# Patient Record
Sex: FEMALE | Race: BLACK OR AFRICAN AMERICAN | NOT HISPANIC OR LATINO | Employment: OTHER | URBAN - METROPOLITAN AREA
[De-identification: names, ages, dates, MRNs, and addresses within clinical notes are randomized per-mention and may not be internally consistent; named-entity substitution may affect disease eponyms.]

---

## 2017-06-29 ENCOUNTER — TRANSCRIBE ORDERS (OUTPATIENT)
Dept: PHYSICAL THERAPY | Facility: HOSPITAL | Age: 47
End: 2017-06-29

## 2017-06-29 DIAGNOSIS — M79.7 FIBROMYALGIA: Primary | ICD-10-CM

## 2017-06-29 DIAGNOSIS — R53.82 CHRONIC FATIGUE: ICD-10-CM

## 2017-07-27 ENCOUNTER — HOSPITAL ENCOUNTER (OUTPATIENT)
Dept: PHYSICAL THERAPY | Facility: HOSPITAL | Age: 47
Setting detail: THERAPIES SERIES
Discharge: HOME | End: 2017-07-27

## 2017-07-27 DIAGNOSIS — M79.601 PAIN IN BOTH UPPER EXTREMITIES: ICD-10-CM

## 2017-07-27 DIAGNOSIS — G93.32 CHRONIC FATIGUE SYNDROME WITH FIBROMYALGIA: Primary | ICD-10-CM

## 2017-07-27 DIAGNOSIS — M79.602 PAIN IN BOTH UPPER EXTREMITIES: ICD-10-CM

## 2017-07-27 DIAGNOSIS — M79.7 CHRONIC FATIGUE SYNDROME WITH FIBROMYALGIA: Primary | ICD-10-CM

## 2017-07-27 PROCEDURE — 97162 PT EVAL MOD COMPLEX 30 MIN: CPT | Performed by: PHYSICAL THERAPIST

## 2017-07-27 NOTE — THERAPY EVALUATION
Outpatient Physical Therapy Ortho Initial Evaluation  [facility]     Patient Name: Sherlyn Russell  : 1970  MRN: [id number]  Today's Date: 2017      Visit Date: 2017    There is no problem list on file for this patient.       No past medical history on file.     No past surgical history on file.    Visit Dx:     ICD-10-CM ICD-9-CM   1. Chronic fatigue syndrome with fibromyalgia R53.82 780.71    M79.7 729.1   2. Pain in both upper extremities M79.601 729.5    M79.602              Patient History       17 1400          History    Brief Description of Current Complaint Tried land PT previously however did not tolerate due to her pain and tenderness.  Recently diagnosed with fibromyalgia, has had symptoms for about 2 years. 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  Pain started in L foot and worked its way up the leg to the knees.  The fingers started to lock up and eventually pain moved to her neck and back withy difficulty moving her head.  Pain then travelled to each arm. Received a cortisone injection to each elbow which gave 24 hours of relief and then pain returned.  Sees Pain management. Had injections in posterior shoulder area February and March which were not helpful.  Is scheduled for another treatment tomorrow which may be for injection to her neck she thinks.  No falls in the past year.  Has difficulty raising arms, (R hand dominant).  Able to walk for 20-30 minutes.  Intermittent numbenss in hands and fingers.  No skin problems.  Using a prescription topical cream - which she feels doesn't helps much.  Does see a counselor every week. Xrays of cervical spine and arm were negative per pt.  MRI scan of neck- showed arthritis and L shoulder done Monday- results pending. Goals: decrease pain,  be able to play with 10 year old granddaughter, be able to raise arms overhead to fix hair and wash back.   Medical history  includes diabetes, acid reflux, depression and anxiety.  -WILLIAM      Onset Date- PT 7/27/2017  -WILLIAM      Occupation/sports/leisure activities Cooking, cleaning, helping people, taking trips  -WILLIAM      Pain     Pain Location --   See body chart  -WILLIAM      Pain at Present 6  -WILLIAM      Pain at Best 6  -WILLIAM      Pain at Worst 8  -WILLIAM      Daily Activities    Pt Participated in POC and Goals Yes  -WILLIAM      Safety    Are you being hurt, hit, or frightened by anyone at home or in your life? No  -WILLIAM      Are you being neglected by a caregiver No  -WILLIAM        User Key  (r) = Recorded By, (t) = Taken By, (c) = Cosigned By    Initials Name Provider Type    WILLIAM Bony Echevarria, PT Physical Therapist                PT Ortho       07/27/17 1300    Posture/Observations    Posture/Observations Comments Forward head and shoulders, elbows flexed (~ 30 degrees).  -WILLIAM    Myotomal Screen- Upper Quarter Clearing     --   Dynamometer R 10#, 3#, 1#,  L 2#, 1#, 1#  -WILLIAM    Cervical/Shoulder ROM Screen    Cervical flexion Impaired   Minimal loss of motion.  -WILLIAM    Cervical extension Impaired   Has 25% of motion.  -WILLIAM    Cervical lateral flexion Impaired   R 10 degrees, L 15 degrees  -WILLIAM    Cervical rotation Impaired   R 10 degrees, L 15 degrees  -WILLIAM    Left Shoulder    Flexion AROM Deficit 60  -WILLIAM    Flexion PROM --   75  -WILLIAM    ABduction AROM Deficit 60  -WILLIAM    ABduction PROM --   60  -WILLIAM    External Rotation AROM Deficit 30  -WILLIAM    Internal Rotation AROM Deficit 30  -WILLIAM    Right Shoulder    Flexion AROM Deficit 75  -WILLIAM    ABduction AROM Deficit 65  -WILLIAM    External Rotation AROM Deficit 35  -WILLIAM    Internal Rotation AROM Deficit 40  -WILLIAM    Left Elbow/Forearm    Extension/Flexion AROM Deficit Lacks 30 degrees from full extension, Flexion is WNL.  -WILLIAM    Right Elbow/Forearm    Extension/Flexion AROM Deficit Lacks 30 degrees from full extension,  Flexion is WNL  -WILLIAM    Gait Assessment/Treatment    Gait, Greenlee Level independent  -WILLIAM    Gait, Comment  Lateral trunk sway during L stance phase, lack of B arm swing, elbows remain flexed, guarded posture.  -WILLIAM      User Key  (r) = Recorded By, (t) = Taken By, (c) = Cosigned By    Initials Name Provider Type    WILLIAM Echevarria, PT Physical Therapist                                PT OP Goals       07/27/17 1400       PT Short Term Goals    STG 1 Sherlyn will tolerate 30-40 minutes aquatic PT in warm pool.  -WILLIAM     STG 1 Progress Met  -WILLIAM     STG 2 Sherlyn will elevate arms to 90 degrees in pool with water at shoulder level.   -WILLIAM     STG 2 Progress New  -WILLIAM     STG 3 Sherlyn will report a decrease in her symptoms during and after aquatic PT.  -WILLIAM     STG 3 Progress New  -WILLIAM     STG 4 Sherlyn will be able to spend time/play with grand daughter.  -WILLIAM     STG 4 Progress New  -WILLIAM     Long Term Goals    LTG 1 Sherlyn will report a decreas in her peak pain from 8/10 to 5/10 or less.  -WILLIAM     LTG 1 Progress New  -WILLIAM     LTG 2 Sherlyn will improve B shoulder AROM to reach overhead to wash and fix hair.  -WILLIAM     LTG 2 Progress New  -WILLIAM     LTG 3 Sherlyn will increase B elbow extension ROM to 10 degrees from neutral.  -WILLIAM     LTG 3 Progress New  -WILLIAM     LTG 4 Sherlyn will improve B  strength to 10 pounds on L and 15 pounds on R to be able to open jars and carry itens.  -WILLIAM     LTG 4 Progress New  -WILLIAM     LTG 5 Improve FIQR by 10%  -WILLIAM     LTG 5 Progress New  -WILLIAM       User Key  (r) = Recorded By, (t) = Taken By, (c) = Cosigned By    Initials Name Provider Type    WILLIAM Echevarria, PT Physical Therapist                PT Assessment/Plan       07/27/17 1404       PT Assessment    Functional Limitations Limitation in home management;Limitations in functional capacity and performance;Performance in leisure activities;Performance in self-care ADL  -WILLIAM     Impairments Pain;Muscle strength;Joint mobility;Range of motion  -WILLIAM     Assessment Comments Sherlyn is a 47 year old female referred to aquatic PT with diagnosis of  fibromylagia and chronic fatigue.  She presents with diffuse pain and tenderness throughout, more significant in B UEs and neck.  She has limited UE AROM and poor  strength.  Formal MMT was not tolerated.  Sherlyn reports she is unable to carry her purse, cook, open jars, lift an half gallon of milk or lift items out of the oven.  She was unable to tolerate land based PT.   -WILLIAM     Please refer to paper survey for additional self-reported information Yes  -WILLIAM     Rehab Potential Other (comment)   Fair to Good  -WILLIAM     Patient/caregiver participated in establishment of treatment plan and goals Yes  -WILLIAM     Patient would benefit from skilled therapy intervention Yes  -WILLIAM     PT Plan    PT Frequency 2x/week  -WILLIAM     Predicted Duration of Therapy Intervention (days/wks) 4 weeks  -WILLIAM     Planned CPT's? PT AQUATIC THERAPY EA 15 MIN: 83551  -WILLIAM     PT Plan Comments Aquatic therapy to improve shoulder and elbow ROM and strength, decrease pain, improve function.  -WILLIAM       User Key  (r) = Recorded By, (t) = Taken By, (c) = Cosigned By    Initials Name Provider Type    WILLIAM Bony Echevarria, PT Physical Therapist                                    Time Calculation:   Start Time: 1303  Stop Time: 1345  Time Calculation (min): 42 min     Therapy Charges for Today     Code Description Service Date Service Provider Modifiers Qty    85428111515 HC PT POLO SARMIENTOAL MOD COMPLEXITY 3 7/27/2017 Bony Echevarria, PT GP 1                    Bony Echevarria, PT  7/27/2017

## 2017-08-08 ENCOUNTER — HOSPITAL ENCOUNTER (OUTPATIENT)
Dept: PHYSICAL THERAPY | Facility: HOSPITAL | Age: 47
Setting detail: THERAPIES SERIES
Discharge: HOME | End: 2017-08-08

## 2017-08-08 DIAGNOSIS — M79.602 PAIN IN BOTH UPPER EXTREMITIES: ICD-10-CM

## 2017-08-08 DIAGNOSIS — M79.7 CHRONIC FATIGUE SYNDROME WITH FIBROMYALGIA: Primary | ICD-10-CM

## 2017-08-08 DIAGNOSIS — G93.32 CHRONIC FATIGUE SYNDROME WITH FIBROMYALGIA: Primary | ICD-10-CM

## 2017-08-08 DIAGNOSIS — M79.601 PAIN IN BOTH UPPER EXTREMITIES: ICD-10-CM

## 2017-08-08 PROCEDURE — 97113 AQUATIC THERAPY/EXERCISES: CPT | Performed by: PHYSICAL THERAPIST

## 2017-08-08 NOTE — THERAPY TREATMENT NOTE
Outpatient Physical Therapy Ortho Treatment Note  [facility]     Patient Name: Sherlyn Russell  : 1970  MRN: [id number]  Today's Date: 2017      Visit Date: 2017    Visit Dx:    ICD-10-CM ICD-9-CM   1. Chronic fatigue syndrome with fibromyalgia R53.82 780.71    M79.7 729.1   2. Pain in both upper extremities M79.601 729.5    M79.602        There is no problem list on file for this patient.       No past medical history on file.     No past surgical history on file.                          PT Assessment/Plan       17 1609       PT Assessment    Assessment Comments pt tolerated ROM left shoulder in plane below and in front of her. weakness with exerting pressure with LEs but this should get better as she gets used to pool and how she feels post ex.   -SP       User Key  (r) = Recorded By, (t) = Taken By, (c) = Cosigned By    Initials Name Provider Type    ANGELA Ramsey PT Physical Therapist                    Exercises       17 1600          Subjective Comments    Subjective Comments states MRI found a tear in her left rotator cuff now she has to see ortho. tired from taking care of her son's 3 puppies lately. hopes water can make her feel better. no prior water ex and does not swim but did not seem fearful of water today  -SP      Subjective Pain    Able to rate subjective pain? yes  -SP      Pre-Treatment Pain Level 6  -SP      Post-Treatment Pain Level 5  -SP      Subjective Pain Comment FLACC  -SP      Aquatics    Aquatics performed? Yes  -SP      Exercise 1    Exercise Name 1 intro to pool via warm water ex on bench. small vs. large ROM today. no resistance just AROM. decompression and biking with LN. hip stretching did okay with LN. pt shaky by end of rx stating she hasn't eaten all day and is borderline DM  -SP        User Key  (r) = Recorded By, (t) = Taken By, (c) = Cosigned By    Initials Name Provider Type    ANGELA Ramsey PT Physical Therapist                                        Time Calculation:   Start Time: 1515  Stop Time: 1600  Time Calculation (min): 45 min    Therapy Charges for Today     Code Description Service Date Service Provider Modifiers Qty    59506198198 HC PT AQUATIC THERAPY EA 15 MIN 8/8/2017 Zorre Zeno Kimura, PT GP 3                    Zorre Zeno Kimura, PT  8/8/2017

## 2017-08-15 ENCOUNTER — HOSPITAL ENCOUNTER (OUTPATIENT)
Dept: PHYSICAL THERAPY | Facility: HOSPITAL | Age: 47
Setting detail: THERAPIES SERIES
Discharge: HOME | End: 2017-08-15

## 2017-08-15 DIAGNOSIS — M79.602 PAIN IN BOTH UPPER EXTREMITIES: ICD-10-CM

## 2017-08-15 DIAGNOSIS — M79.7 CHRONIC FATIGUE SYNDROME WITH FIBROMYALGIA: Primary | ICD-10-CM

## 2017-08-15 DIAGNOSIS — G93.32 CHRONIC FATIGUE SYNDROME WITH FIBROMYALGIA: Primary | ICD-10-CM

## 2017-08-15 DIAGNOSIS — M79.601 PAIN IN BOTH UPPER EXTREMITIES: ICD-10-CM

## 2017-08-15 PROCEDURE — 97113 AQUATIC THERAPY/EXERCISES: CPT | Performed by: PHYSICAL THERAPIST

## 2017-08-15 NOTE — THERAPY TREATMENT NOTE
"    Outpatient Physical Therapy Ortho Treatment Note  [facility]     Patient Name: Sherlyn Russell  : 1970  MRN: [id number]  Today's Date: 8/15/2017      Visit Date: 08/15/2017    Visit Dx:    ICD-10-CM ICD-9-CM   1. Chronic fatigue syndrome with fibromyalgia R53.82 780.71    M79.7 729.1   2. Pain in both upper extremities M79.601 729.5    M79.602        There is no problem list on file for this patient.       No past medical history on file.     No past surgical history on file.                          PT Assessment/Plan       08/15/17 1715       PT Assessment    Assessment Comments Patient guarded with L shoulder demonstrating very little AROM away from body and also painful to most other exercise attempts for cervical and lower body.  Today patient seems to have too high pain level limiting to aquatic exercises.   -PB       User Key  (r) = Recorded By, (t) = Taken By, (c) = Cosigned By    Initials Name Provider Type    SUKHI Erazo, PT Physical Therapist                    Exercises       08/15/17 1700          Subjective Comments    Subjective Comments Pain the worst in L shoulder and waiting on schedule for ortho appt   -PB      Subjective Pain    Able to rate subjective pain? yes  -PB      Pre-Treatment Pain Level 7  -PB      Post-Treatment Pain Level 7  -PB      Aquatics    Aquatics performed? Yes  -PB      Aquatics LE    Water Walk forward;side   x 2 laps  -PB      Stretch 1 HS / hip rotation with SN x 30: B  -PB      Stretch 2 Seated calf stretch x 30\" B   -PB      Stretch 3 Attempted gentle cervical stretching with pain   -PB      March in Place Seated x 10  -PB      Bicycle Seated x 2 min   -PB      Flutter/Scissor Seated x 15 each   -PB      Aquatics UE    Stretch 1 AAROM with SN press forwards x 10   -PB      Stretch 2 AAROM with SN side to side x 10   -PB      Scap Retraction/Row Shoulder rolls x 10   -PB      Scaption AAROM shoulder flexion x 3   -PB        User Key  (r) = Recorded By, " (t) = Taken By, (c) = Cosigned By    Initials Name Provider Type    PB Nancy Erazo, PT Physical Therapist                                       Time Calculation:   Start Time: 1350  Stop Time: 1429  Time Calculation (min): 39 min    Therapy Charges for Today     Code Description Service Date Service Provider Modifiers Qty    48212610264 HC PT AQUATIC THERAPY EA 15 MIN 8/15/2017 Nancy Erazo, PT GP 3                    Nancy Erazo PT  8/15/2017

## 2017-08-18 ENCOUNTER — HOSPITAL ENCOUNTER (OUTPATIENT)
Dept: PHYSICAL THERAPY | Facility: HOSPITAL | Age: 47
Setting detail: THERAPIES SERIES
End: 2017-08-18

## 2017-08-22 ENCOUNTER — HOSPITAL ENCOUNTER (OUTPATIENT)
Dept: PHYSICAL THERAPY | Facility: HOSPITAL | Age: 47
Setting detail: THERAPIES SERIES
Discharge: HOME | End: 2017-08-22

## 2017-08-22 DIAGNOSIS — G93.32 CHRONIC FATIGUE SYNDROME WITH FIBROMYALGIA: Primary | ICD-10-CM

## 2017-08-22 DIAGNOSIS — M79.602 PAIN IN BOTH UPPER EXTREMITIES: ICD-10-CM

## 2017-08-22 DIAGNOSIS — M79.7 CHRONIC FATIGUE SYNDROME WITH FIBROMYALGIA: Primary | ICD-10-CM

## 2017-08-22 DIAGNOSIS — M79.601 PAIN IN BOTH UPPER EXTREMITIES: ICD-10-CM

## 2017-08-22 PROCEDURE — 97113 AQUATIC THERAPY/EXERCISES: CPT | Performed by: PHYSICAL THERAPIST

## 2017-08-22 NOTE — THERAPY TREATMENT NOTE
"    Outpatient Physical Therapy Ortho Treatment Note  [facility]     Patient Name: Sherlyn Russell  : 1970  MRN: [id number]  Today's Date: 2017      Visit Date: 2017    Visit Dx:    ICD-10-CM ICD-9-CM   1. Chronic fatigue syndrome with fibromyalgia R53.82 780.71    M79.7 729.1   2. Pain in both upper extremities M79.601 729.5    M79.602        There is no problem list on file for this patient.       No past medical history on file.     No past surgical history on file.                          PT Assessment/Plan       17 1314       PT Assessment    Assessment Comments Patient cues throughout session to try to reduce muscle holding pattern of L shoulder that is the most painful to her now.  She was able to perform more exercises than first session.   -PB       User Key  (r) = Recorded By, (t) = Taken By, (c) = Cosigned By    Initials Name Provider Type    SUKHI Erazo, PT Physical Therapist                    Exercises       17 1000          Subjective Comments    Subjective Comments I see shoulder MD in 3 weeks  -PB      Subjective Pain    Able to rate subjective pain? yes  -PB      Pre-Treatment Pain Level 7  -PB      Post-Treatment Pain Level 7  -PB      Subjective Pain Comment 7/10 L shld; 5/10 LB  -PB      Aquatics LE    Water Walk forward;side   x 2 laps  -PB      Stretch 1 HS / hip rotation with SN x 30\" B  -PB      Stretch 2 Seated calf stretch x 30\" B   -PB      Stretch 3 --  -PB      Clams Hip circles x 10/2 B  -PB      Hip Abd/Add 10 B  -PB      Hip Flex/Ext 10 B   -PB      March in Place Seated x 10; standing x 10   -PB      Mini Squat 10   -PB      Bicycle Seated x 2 min   -PB      Flutter/Scissor Seated x 15 each   -PB      Aquatics UE    Stretch 1 AAROM with SN press forwards x 10   -PB      Stretch 2 AAROM with SN side to side x 10   -PB      Scaption AAROM shoulder flexion x 10 sitting   -PB      Exercise 1    Exercise Name 1 Patient instructed out of pool with " ella DANIELS        User Key  (r) = Recorded By, (t) = Taken By, (c) = Cosigned By    Initials Name Provider Type    PB Nancy Erazo, PT Physical Therapist                                       Time Calculation:   Start Time: 1031  Stop Time: 1115  Time Calculation (min): 44 min    Therapy Charges for Today     Code Description Service Date Service Provider Modifiers Qty    31977660990  PT AQUATIC THERAPY EA 15 MIN 8/22/2017 Nancy Erazo, PT GP 3                    Nancy Erazo, PT  8/22/2017

## 2017-08-24 ENCOUNTER — HOSPITAL ENCOUNTER (OUTPATIENT)
Dept: PHYSICAL THERAPY | Facility: HOSPITAL | Age: 47
Setting detail: THERAPIES SERIES
End: 2017-08-24

## 2017-08-29 ENCOUNTER — HOSPITAL ENCOUNTER (OUTPATIENT)
Dept: PHYSICAL THERAPY | Facility: HOSPITAL | Age: 47
Setting detail: THERAPIES SERIES
Discharge: HOME | End: 2017-08-29

## 2017-08-29 DIAGNOSIS — M79.602 PAIN IN BOTH UPPER EXTREMITIES: ICD-10-CM

## 2017-08-29 DIAGNOSIS — M79.601 PAIN IN BOTH UPPER EXTREMITIES: ICD-10-CM

## 2017-08-29 DIAGNOSIS — M79.7 CHRONIC FATIGUE SYNDROME WITH FIBROMYALGIA: Primary | ICD-10-CM

## 2017-08-29 DIAGNOSIS — G93.32 CHRONIC FATIGUE SYNDROME WITH FIBROMYALGIA: Primary | ICD-10-CM

## 2017-08-29 PROCEDURE — 97113 AQUATIC THERAPY/EXERCISES: CPT | Performed by: PHYSICAL THERAPIST

## 2017-08-29 NOTE — THERAPY RE-EVALUATION
Outpatient Physical Therapy Ortho Re-Assessment  [facility]     Patient Name: Sherlyn Russell  : 1970  MRN: [id number]  Today's Date: 2017      Visit Date: 2017    There is no problem list on file for this patient.       No past medical history on file.     No past surgical history on file.    Visit Dx:     ICD-10-CM ICD-9-CM   1. Chronic fatigue syndrome with fibromyalgia R53.82 780.71    M79.7 729.1   2. Pain in both upper extremities M79.601 729.5    M79.602                                      PT OP Goals       17 0900       PT Short Term Goals    STG 1 Sherlyn will tolerate 30-40 minutes aquatic PT in warm pool.  -PB     STG 1 Progress Met  -PB     STG 2 Sherlyn will elevate arms to 90 degrees in pool with water at shoulder level.   -PB     STG 2 Progress Ongoing  -PB     STG 2 Progress Comments AROM L shoulder flexion 82, ER 61  -PB     STG 3 Sherlyn will report a decrease in her symptoms during and after aquatic PT.  -PB     STG 3 Progress Ongoing  -PB     STG 3 Progress Comments Pain level lowered slightly in pool by 1 point  -PB     STG 4 Sherlyn will be able to spend time/play with grand daughter.  -PB     STG 4 Progress Ongoing  -PB     Long Term Goals    LTG 1 Sherlyn will report a decreas in her peak pain from 8/10 to 5/10 or less.  -PB     LTG 1 Progress Ongoing  -PB     LTG 1 Progress Comments L shoulder pain high   -PB     LTG 2 Sherlyn will improve B shoulder AROM to reach overhead to wash and fix hair.  -PB     LTG 2 Progress Ongoing  -PB     LTG 2 Progress Comments Able to reach overhead with R only   -PB     LTG 3 Sherlyn will increase B elbow extension ROM to 10 degrees from neutral.  -PB     LTG 3 Progress Ongoing  -PB     LTG 3 Progress Comments L elbow ext lacks 25 degrees  -PB     LTG 4 Sherlyn will improve B  strength to 10 pounds on L and 15 pounds on R to be able to open jars and carry itens.  -PB     LTG 4 Progress Ongoing  -PB     LTG 5 Improve FIQR by  10%  -PB     LTG 5 Progress Ongoing  -PB       User Key  (r) = Recorded By, (t) = Taken By, (c) = Cosigned By    Initials Name Provider Type    SUKHI Erazo, PT Physical Therapist                PT Assessment/Plan       08/29/17 1040       PT Assessment    Functional Limitations Limitation in home management;Limitations in functional capacity and performance;Performance in leisure activities;Performance in self-care ADL  -PB     Impairments Pain;Muscle strength;Joint mobility;Range of motion  -PB     Assessment Comments Kathe has been seen for 4 sessions aquatic therapy after evaluation for back and shoulder/arm pain. She has primary pain in the L shoulder and  elbow with rotator cuff tear that is very limited by pain guarding ROM. She was able to move the L arm in the pool today with better mobility of about 50%. She is scheduled to see shoulder MD in about 2 weeks.  She also reports moderate pain of her lower back 5-7/10 but would like to start walking for exercise. She is motivated to reduce her use of pain medications.  She will benefit from progression to [facility] but may need further shoulder specific therapy later.  -PB     Rehab Potential Good  -PB     Patient/caregiver participated in establishment of treatment plan and goals Yes  -PB     Patient would benefit from skilled therapy intervention Yes  -PB     PT Plan    PT Frequency 2x/week  -PB     Predicted Duration of Therapy Intervention (days/wks) 30 days   -PB     Planned CPT's? PT AQUATIC THERAPY EA 15 MIN: 80819;PT THER PROC EA 15 MIN: 43252;PT MANUAL THERAPY EA 15 MIN: 71981  -PB       User Key  (r) = Recorded By, (t) = Taken By, (c) = Cosigned By    Initials Name Provider Type    SUKHI Erazo PT Physical Therapist                  Exercises       08/29/17 1000          Subjective Comments    Subjective Comments I was in too much pain last week to come  -PB      Subjective Pain    Able to rate subjective pain? yes  -PB      Pre-Treatment Pain  Level 7  -PB      Post-Treatment Pain Level 6  -PB      Subjective Pain Comment 7/10 pain L shoulder and LB  -PB      Aquatics LE    Water Walk forward;side   x 3 laps  -PB      Hip Abd/Add 15 B  -PB      March in Place Stand x 12  -PB      Bicycle Seated x 2 min   -PB      Flutter/Scissor Seated x 15 each   -PB      Aquatics UE    Stretch 1 AAROM with SN press forwards x 10   -PB      Stretch 2 AAROM with SN side to side x 10   -PB      Scap Retraction/Row Elbow curls and shoulder ER x 10 each   -PB      Scaption AAROM shoulder flexion x 10 sitting   -PB      Exercise 1    Exercise Name 1 Gentle PROM to L shoulder and elbow working on shoulder flex, ER/IR and elbow ext   -PB        User Key  (r) = Recorded By, (t) = Taken By, (c) = Cosigned By    Initials Name Provider Type    PB Nancy Erazo, PT Physical Therapist                              Time Calculation:   Start Time: 0945  Stop Time: 1030  Time Calculation (min): 45 min     Therapy Charges for Today     Code Description Service Date Service Provider Modifiers Qty    92421664901 HC PT AQUATIC THERAPY EA 15 MIN 8/29/2017 Nancy Erazo, PT GP 3                    Nancy Erazo, PT  8/29/2017

## 2017-08-31 ENCOUNTER — HOSPITAL ENCOUNTER (OUTPATIENT)
Dept: PHYSICAL THERAPY | Facility: HOSPITAL | Age: 47
Setting detail: THERAPIES SERIES
End: 2017-08-31

## 2017-10-17 ENCOUNTER — DOCUMENTATION (OUTPATIENT)
Dept: PHYSICAL THERAPY | Facility: HOSPITAL | Age: 47
End: 2017-10-17

## 2017-11-09 ENCOUNTER — DOCUMENTATION (OUTPATIENT)
Dept: PHYSICAL THERAPY | Facility: HOSPITAL | Age: 47
End: 2017-11-09

## 2017-11-09 NOTE — THERAPY DISCHARGE NOTE
Outpatient Physical Therapy Discharge Summary         Patient Name: Sherlyn Russell  : 1970  MRN: [id number]    Today's Date: 2017    Visit Dx:  No diagnosis found.          PT OP Goals       17 1500       PT Short Term Goals    STG 1 Sherlyn will tolerate 30-40 minutes aquatic PT in warm pool.  -PB     STG 1 Progress Met  -PB     STG 2 Sherlyn will elevate arms to 90 degrees in pool with water at shoulder level.   -PB     STG 2 Progress Not Met  -PB     STG 2 Progress Comments AROM L shoulder 82 degrees  -PB     STG 3 Sherlyn will report a decrease in her symptoms during and after aquatic PT.  -PB     STG 3 Progress Not Met  -PB     STG 3 Progress Comments Pain lowered slightly by 1 point in pool  -PB     STG 4 Sherlyn will be able to spend time/play with grand daughter.  -PB     STG 4 Progress Not Met  -PB     Long Term Goals    LTG 1 Sherlyn will report a decreas in her peak pain from 8/10 to 5/10 or less.  -PB     LTG 1 Progress Not Met  -PB     LTG 1 Progress Comments L shoulder pain rated high  -PB     LTG 2 Sherlyn will improve B shoulder AROM to reach overhead to wash and fix hair.  -PB     LTG 2 Progress Not Met  -PB     LTG 2 Progress Comments Able to reach overhead with R only   -PB     LTG 3 Sherlyn will increase B elbow extension ROM to 10 degrees from neutral.  -PB     LTG 3 Progress Not Met  -PB     LTG 3 Progress Comments L elbow lacks 25 degrees ext  -PB     LTG 4 Sherlyn will improve B  strength to 10 pounds on L and 15 pounds on R to be able to open jars and carry itens.  -PB     LTG 4 Progress Not Met  -PB     LTG 5 Improve FIQR by 10%  -PB     LTG 5 Progress Not Met  -PB       User Key  (r) = Recorded By, (t) = Taken By, (c) = Cosigned By    Initials Name Provider Type    SUKHI Erazo, PT Physical Therapist          OP PT Discharge Summary  Date of Discharge: 17  Reason for Discharge: Patient/Caregiver request  Outcomes Achieved: Unable to make functional  progress toward goals at this time  Discharge Destination: Other (comment) (Patient did not return )  Discharge Instructions: Ms. Russell was seen for evaluation and 4 aquatic therapy sessions for arm and leg pain.  She was limited in her exercise performance in warm pool by pain mostly in the L shoulder.  She did not return for further PT so is discharged.       Time Calculation:                    Nancy Erazo, PT  11/9/2017

## 2018-01-18 ENCOUNTER — OFFICE (OUTPATIENT)
Dept: URBAN - METROPOLITAN AREA CLINIC 66 | Facility: CLINIC | Age: 48
End: 2018-01-18
Payer: COMMERCIAL

## 2018-01-18 VITALS
HEART RATE: 94 BPM | HEIGHT: 63 IN | SYSTOLIC BLOOD PRESSURE: 131 MMHG | WEIGHT: 182 LBS | DIASTOLIC BLOOD PRESSURE: 89 MMHG | TEMPERATURE: 98.7 F

## 2018-01-18 DIAGNOSIS — K31.84 GASTROPARESIS: ICD-10-CM

## 2018-01-18 DIAGNOSIS — R11.2 NAUSEA WITH VOMITING, UNSPECIFIED: ICD-10-CM

## 2018-01-18 DIAGNOSIS — K21.9 GASTRO-ESOPHAGEAL REFLUX DISEASE WITHOUT ESOPHAGITIS: ICD-10-CM

## 2018-01-18 DIAGNOSIS — K44.9 DIAPHRAGMATIC HERNIA WITHOUT OBSTRUCTION OR GANGRENE: ICD-10-CM

## 2018-01-18 PROCEDURE — 99214 OFFICE O/P EST MOD 30 MIN: CPT

## 2018-01-18 RX ORDER — ONDANSETRON HYDROCHLORIDE 4 MG/1
24 TABLET, FILM COATED ORAL
Qty: 45 | Refills: 3 | Status: ACTIVE
Start: 2018-01-18

## 2018-01-18 RX ORDER — RANITIDINE HYDROCHLORIDE 300 MG/1
CAPSULE ORAL
Qty: 180 | Refills: 3 | Status: COMPLETED
End: 2019-10-17

## 2018-02-20 ENCOUNTER — OFFICE (OUTPATIENT)
Dept: URBAN - METROPOLITAN AREA CLINIC 66 | Facility: CLINIC | Age: 48
End: 2018-02-20
Payer: COMMERCIAL

## 2018-02-20 VITALS
SYSTOLIC BLOOD PRESSURE: 131 MMHG | HEIGHT: 63 IN | WEIGHT: 183 LBS | TEMPERATURE: 98.8 F | DIASTOLIC BLOOD PRESSURE: 88 MMHG | HEART RATE: 92 BPM

## 2018-02-20 DIAGNOSIS — R10.31 RIGHT LOWER QUADRANT PAIN: ICD-10-CM

## 2018-02-20 DIAGNOSIS — K21.9 GASTRO-ESOPHAGEAL REFLUX DISEASE WITHOUT ESOPHAGITIS: ICD-10-CM

## 2018-02-20 DIAGNOSIS — K31.84 GASTROPARESIS: ICD-10-CM

## 2018-02-20 PROCEDURE — 99213 OFFICE O/P EST LOW 20 MIN: CPT

## 2018-03-01 ENCOUNTER — OFFICE (OUTPATIENT)
Dept: URBAN - METROPOLITAN AREA CLINIC 66 | Facility: CLINIC | Age: 48
End: 2018-03-01
Payer: COMMERCIAL

## 2018-03-01 VITALS
TEMPERATURE: 98.8 F | SYSTOLIC BLOOD PRESSURE: 134 MMHG | DIASTOLIC BLOOD PRESSURE: 92 MMHG | HEIGHT: 63 IN | WEIGHT: 182 LBS | HEART RATE: 83 BPM

## 2018-03-01 DIAGNOSIS — K31.84 GASTROPARESIS: ICD-10-CM

## 2018-03-01 DIAGNOSIS — R10.31 RIGHT LOWER QUADRANT PAIN: ICD-10-CM

## 2018-03-01 DIAGNOSIS — K21.9 GASTRO-ESOPHAGEAL REFLUX DISEASE WITHOUT ESOPHAGITIS: ICD-10-CM

## 2018-03-01 PROCEDURE — 99214 OFFICE O/P EST MOD 30 MIN: CPT | Mod: 25

## 2018-03-01 PROCEDURE — 20552 NJX 1/MLT TRIGGER POINT 1/2: CPT

## 2018-04-19 ENCOUNTER — OFFICE (OUTPATIENT)
Dept: URBAN - METROPOLITAN AREA CLINIC 66 | Facility: CLINIC | Age: 48
End: 2018-04-19
Payer: COMMERCIAL

## 2018-04-19 VITALS
DIASTOLIC BLOOD PRESSURE: 93 MMHG | WEIGHT: 181 LBS | SYSTOLIC BLOOD PRESSURE: 144 MMHG | HEART RATE: 85 BPM | HEIGHT: 63 IN | TEMPERATURE: 98.3 F

## 2018-04-19 DIAGNOSIS — M79.1 MYALGIA: ICD-10-CM

## 2018-04-19 DIAGNOSIS — K44.9 DIAPHRAGMATIC HERNIA WITHOUT OBSTRUCTION OR GANGRENE: ICD-10-CM

## 2018-04-19 DIAGNOSIS — K31.84 GASTROPARESIS: ICD-10-CM

## 2018-04-19 DIAGNOSIS — R13.10 DYSPHAGIA, UNSPECIFIED: ICD-10-CM

## 2018-04-19 PROCEDURE — 99214 OFFICE O/P EST MOD 30 MIN: CPT | Mod: 25

## 2018-04-19 PROCEDURE — 20552 NJX 1/MLT TRIGGER POINT 1/2: CPT

## 2018-04-19 RX ORDER — RANITIDINE HYDROCHLORIDE 300 MG/1
CAPSULE ORAL
Qty: 180 | Refills: 3 | Status: COMPLETED
End: 2019-10-17

## 2018-04-19 RX ORDER — PANTOPRAZOLE SODIUM 40 MG/1
TABLET, DELAYED RELEASE ORAL
Qty: 180 | Refills: 3 | Status: ACTIVE

## 2018-05-02 ENCOUNTER — AMBULATORY SURGICAL CENTER (OUTPATIENT)
Dept: URBAN - METROPOLITAN AREA SURGERY 20 | Facility: SURGERY | Age: 48
End: 2018-05-02
Payer: COMMERCIAL

## 2018-05-02 ENCOUNTER — OFFICE (OUTPATIENT)
Dept: URBAN - METROPOLITAN AREA PATHOLOGY 4 | Facility: PATHOLOGY | Age: 48
End: 2018-05-02
Payer: COMMERCIAL

## 2018-05-02 VITALS — HEIGHT: 63 IN

## 2018-05-02 DIAGNOSIS — K21.0 GASTRO-ESOPHAGEAL REFLUX DISEASE WITH ESOPHAGITIS: ICD-10-CM

## 2018-05-02 DIAGNOSIS — K44.9 DIAPHRAGMATIC HERNIA WITHOUT OBSTRUCTION OR GANGRENE: ICD-10-CM

## 2018-05-02 DIAGNOSIS — R13.10 DYSPHAGIA, UNSPECIFIED: ICD-10-CM

## 2018-05-02 DIAGNOSIS — K22.2 ESOPHAGEAL OBSTRUCTION: ICD-10-CM

## 2018-05-02 LAB
GI HISTOLOGY: A. SELECT: (no result)
GI HISTOLOGY: PDF REPORT: (no result)

## 2018-05-02 PROCEDURE — 43239 EGD BIOPSY SINGLE/MULTIPLE: CPT

## 2018-05-02 PROCEDURE — 43450 DILATE ESOPHAGUS 1/MULT PASS: CPT

## 2018-05-02 PROCEDURE — 88305 TISSUE EXAM BY PATHOLOGIST: CPT

## 2018-05-02 RX ADMIN — FENTANYL CITRATE 75 MCG: 50 INJECTION, SOLUTION INTRAMUSCULAR; INTRAVENOUS at 09:19

## 2018-05-02 RX ADMIN — MIDAZOLAM HYDROCHLORIDE 6 MG: 5 INJECTION, SOLUTION INTRAMUSCULAR; INTRAVENOUS at 09:19

## 2018-05-02 NOTE — SERVICEHPINOTES
ROSA CHAU  is a  47  female   who presents today for a  EGD   for   the indications listed below. The updated Patient Profile was reviewed prior to the procedure, in conjunction with the Physical Exam, including medical conditions, surgical procedures, medications, allergies, family history and social history. See Physical Exam time stamp below for date and time of HPI completion.Pre-operatively, I reviewed the indication(s) for the procedure, the risks of the procedure [including but not limited to: unexpected bleeding possibly requiring hospitalization and/or unplanned repeat procedures, perforation possibly requiring surgical treatment, missed lesions and complications of sedation/MAC (also explained by anesthesia staff)]. I have evaluated the patient for risks associated with the planned anesthesia and the procedure to be performed and find the patient an acceptable candidate for IV sedation.Multiple opportunities were provided for any questions or concerns, and all questions were answered satisfactorily before any anesthesia was administered. We will proceed with the planned procedure.BR

## 2018-05-24 ENCOUNTER — OFFICE (OUTPATIENT)
Dept: URBAN - METROPOLITAN AREA CLINIC 66 | Facility: CLINIC | Age: 48
End: 2018-05-24
Payer: COMMERCIAL

## 2018-05-24 VITALS
HEIGHT: 63 IN | WEIGHT: 175 LBS | DIASTOLIC BLOOD PRESSURE: 82 MMHG | HEART RATE: 78 BPM | SYSTOLIC BLOOD PRESSURE: 134 MMHG

## 2018-05-24 DIAGNOSIS — K31.84 GASTROPARESIS: ICD-10-CM

## 2018-05-24 DIAGNOSIS — K21.0 GASTRO-ESOPHAGEAL REFLUX DISEASE WITH ESOPHAGITIS: ICD-10-CM

## 2018-05-24 DIAGNOSIS — R13.10 DYSPHAGIA, UNSPECIFIED: ICD-10-CM

## 2018-05-24 PROBLEM — K20.9 ESOPHAGITIS, UNSPECIFIED: Status: ACTIVE | Noted: 2018-05-02

## 2018-05-24 PROCEDURE — 99213 OFFICE O/P EST LOW 20 MIN: CPT

## 2018-05-24 RX ORDER — SUCRALFATE 1 G/10ML
4000 SUSPENSION ORAL
Qty: 400 | Refills: 2 | Status: ACTIVE
Start: 2018-05-24

## 2018-06-22 ENCOUNTER — OFFICE (OUTPATIENT)
Dept: URBAN - METROPOLITAN AREA CLINIC 66 | Facility: CLINIC | Age: 48
End: 2018-06-22
Payer: COMMERCIAL

## 2018-06-22 VITALS
WEIGHT: 173 LBS | SYSTOLIC BLOOD PRESSURE: 118 MMHG | HEIGHT: 63 IN | DIASTOLIC BLOOD PRESSURE: 86 MMHG | HEART RATE: 83 BPM

## 2018-06-22 DIAGNOSIS — K59.00 CONSTIPATION, UNSPECIFIED: ICD-10-CM

## 2018-06-22 DIAGNOSIS — K64.8 OTHER HEMORRHOIDS: ICD-10-CM

## 2018-06-22 DIAGNOSIS — K21.9 GASTRO-ESOPHAGEAL REFLUX DISEASE WITHOUT ESOPHAGITIS: ICD-10-CM

## 2018-06-22 PROCEDURE — 99214 OFFICE O/P EST MOD 30 MIN: CPT

## 2018-08-10 ENCOUNTER — OFFICE (OUTPATIENT)
Dept: URBAN - METROPOLITAN AREA CLINIC 66 | Facility: CLINIC | Age: 48
End: 2018-08-10
Payer: COMMERCIAL

## 2018-08-10 VITALS
WEIGHT: 176 LBS | DIASTOLIC BLOOD PRESSURE: 81 MMHG | HEART RATE: 78 BPM | SYSTOLIC BLOOD PRESSURE: 124 MMHG | HEIGHT: 63 IN

## 2018-08-10 DIAGNOSIS — K59.00 CONSTIPATION, UNSPECIFIED: ICD-10-CM

## 2018-08-10 DIAGNOSIS — R10.31 RIGHT LOWER QUADRANT PAIN: ICD-10-CM

## 2018-08-10 DIAGNOSIS — K21.9 GASTRO-ESOPHAGEAL REFLUX DISEASE WITHOUT ESOPHAGITIS: ICD-10-CM

## 2018-08-10 PROCEDURE — 99213 OFFICE O/P EST LOW 20 MIN: CPT

## 2018-08-10 RX ORDER — DEXLANSOPRAZOLE 60 MG/1
60 CAPSULE, DELAYED RELEASE ORAL
Qty: 30 | Refills: 11 | Status: ACTIVE
Start: 2018-06-28

## 2018-09-14 ENCOUNTER — OFFICE (OUTPATIENT)
Dept: URBAN - METROPOLITAN AREA CLINIC 66 | Facility: CLINIC | Age: 48
End: 2018-09-14
Payer: COMMERCIAL

## 2018-09-14 VITALS
SYSTOLIC BLOOD PRESSURE: 125 MMHG | HEART RATE: 87 BPM | WEIGHT: 177 LBS | HEIGHT: 63 IN | DIASTOLIC BLOOD PRESSURE: 74 MMHG

## 2018-09-14 DIAGNOSIS — K21.9 GASTRO-ESOPHAGEAL REFLUX DISEASE WITHOUT ESOPHAGITIS: ICD-10-CM

## 2018-09-14 DIAGNOSIS — K59.00 CONSTIPATION, UNSPECIFIED: ICD-10-CM

## 2018-09-14 DIAGNOSIS — K31.84 GASTROPARESIS: ICD-10-CM

## 2018-09-14 PROCEDURE — 99213 OFFICE O/P EST LOW 20 MIN: CPT

## 2018-10-31 ENCOUNTER — OFFICE (OUTPATIENT)
Dept: URBAN - METROPOLITAN AREA CLINIC 66 | Facility: CLINIC | Age: 48
End: 2018-10-31
Payer: COMMERCIAL

## 2018-10-31 VITALS
HEART RATE: 91 BPM | HEIGHT: 63 IN | WEIGHT: 177 LBS | SYSTOLIC BLOOD PRESSURE: 129 MMHG | DIASTOLIC BLOOD PRESSURE: 84 MMHG

## 2018-10-31 DIAGNOSIS — K31.84 GASTROPARESIS: ICD-10-CM

## 2018-10-31 DIAGNOSIS — K21.9 GASTRO-ESOPHAGEAL REFLUX DISEASE WITHOUT ESOPHAGITIS: ICD-10-CM

## 2018-10-31 DIAGNOSIS — R11.2 NAUSEA WITH VOMITING, UNSPECIFIED: ICD-10-CM

## 2018-10-31 DIAGNOSIS — M79.10 MYALGIA, UNSPECIFIED SITE: ICD-10-CM

## 2018-10-31 PROCEDURE — 99214 OFFICE O/P EST MOD 30 MIN: CPT

## 2018-10-31 PROCEDURE — 20552 NJX 1/MLT TRIGGER POINT 1/2: CPT

## 2018-10-31 RX ORDER — LINACLOTIDE 145 UG/1
145 CAPSULE, GELATIN COATED ORAL
Qty: 90 | Refills: 3 | Status: COMPLETED
Start: 2018-06-28 | End: 2019-10-17

## 2018-10-31 RX ORDER — METOCLOPRAMIDE 5 MG/1
TABLET ORAL
Qty: 120 | Refills: 3 | Status: COMPLETED
Start: 2018-10-31 | End: 2020-03-25

## 2018-10-31 RX ORDER — PANTOPRAZOLE SODIUM 40 MG/1
TABLET, DELAYED RELEASE ORAL
Qty: 180 | Refills: 3 | Status: ACTIVE

## 2019-01-10 ENCOUNTER — OFFICE (OUTPATIENT)
Dept: URBAN - METROPOLITAN AREA CLINIC 66 | Facility: CLINIC | Age: 49
End: 2019-01-10
Payer: COMMERCIAL

## 2019-01-10 VITALS
SYSTOLIC BLOOD PRESSURE: 118 MMHG | DIASTOLIC BLOOD PRESSURE: 86 MMHG | WEIGHT: 178 LBS | HEIGHT: 63 IN | HEART RATE: 99 BPM

## 2019-01-10 DIAGNOSIS — K21.9 GASTRO-ESOPHAGEAL REFLUX DISEASE WITHOUT ESOPHAGITIS: ICD-10-CM

## 2019-01-10 DIAGNOSIS — R12 HEARTBURN: ICD-10-CM

## 2019-01-10 DIAGNOSIS — R13.10 DYSPHAGIA, UNSPECIFIED: ICD-10-CM

## 2019-01-10 DIAGNOSIS — E66.9 OBESITY, UNSPECIFIED: ICD-10-CM

## 2019-01-10 DIAGNOSIS — K31.84 GASTROPARESIS: ICD-10-CM

## 2019-01-10 PROCEDURE — 99213 OFFICE O/P EST LOW 20 MIN: CPT

## 2019-03-06 ENCOUNTER — OFFICE (OUTPATIENT)
Dept: URBAN - METROPOLITAN AREA CLINIC 66 | Facility: CLINIC | Age: 49
End: 2019-03-06
Payer: COMMERCIAL

## 2019-03-06 VITALS
HEART RATE: 91 BPM | WEIGHT: 182 LBS | SYSTOLIC BLOOD PRESSURE: 119 MMHG | HEIGHT: 63 IN | DIASTOLIC BLOOD PRESSURE: 87 MMHG

## 2019-03-06 DIAGNOSIS — K21.9 GASTRO-ESOPHAGEAL REFLUX DISEASE WITHOUT ESOPHAGITIS: ICD-10-CM

## 2019-03-06 DIAGNOSIS — K59.00 CONSTIPATION, UNSPECIFIED: ICD-10-CM

## 2019-03-06 DIAGNOSIS — R11.2 NAUSEA WITH VOMITING, UNSPECIFIED: ICD-10-CM

## 2019-03-06 DIAGNOSIS — R13.10 DYSPHAGIA, UNSPECIFIED: ICD-10-CM

## 2019-03-06 PROCEDURE — 99213 OFFICE O/P EST LOW 20 MIN: CPT

## 2019-03-18 ENCOUNTER — AMBULATORY SURGICAL CENTER (OUTPATIENT)
Dept: URBAN - METROPOLITAN AREA SURGERY 20 | Facility: SURGERY | Age: 49
End: 2019-03-18
Payer: COMMERCIAL

## 2019-03-18 ENCOUNTER — OFFICE (OUTPATIENT)
Dept: URBAN - METROPOLITAN AREA PATHOLOGY 4 | Facility: PATHOLOGY | Age: 49
End: 2019-03-18
Payer: COMMERCIAL

## 2019-03-18 VITALS — HEIGHT: 63 IN

## 2019-03-18 DIAGNOSIS — R11.2 NAUSEA WITH VOMITING, UNSPECIFIED: ICD-10-CM

## 2019-03-18 DIAGNOSIS — R12 HEARTBURN: ICD-10-CM

## 2019-03-18 DIAGNOSIS — K22.2 ESOPHAGEAL OBSTRUCTION: ICD-10-CM

## 2019-03-18 DIAGNOSIS — K21.0 GASTRO-ESOPHAGEAL REFLUX DISEASE WITH ESOPHAGITIS: ICD-10-CM

## 2019-03-18 DIAGNOSIS — K44.9 DIAPHRAGMATIC HERNIA WITHOUT OBSTRUCTION OR GANGRENE: ICD-10-CM

## 2019-03-18 PROBLEM — R11.10 REGURGITATION: Status: ACTIVE | Noted: 2019-03-18

## 2019-03-18 LAB
GI HISTOLOGY: A. SELECT: (no result)
GI HISTOLOGY: PDF REPORT: (no result)

## 2019-03-18 PROCEDURE — 43239 EGD BIOPSY SINGLE/MULTIPLE: CPT

## 2019-03-18 PROCEDURE — 43450 DILATE ESOPHAGUS 1/MULT PASS: CPT

## 2019-03-18 PROCEDURE — 88305 TISSUE EXAM BY PATHOLOGIST: CPT

## 2019-03-18 NOTE — SERVICEHPINOTES
ROSA CHAU  is a  48  female   who presents today for a  EGD   for   the indications listed below. The updated Patient Profile was reviewed prior to the procedure, in conjunction with the Physical Exam, including medical conditions, surgical procedures, medications, allergies, family history and social history. See Physical Exam time stamp below for date and time of HPI completion.Pre-operatively, I reviewed the indication(s) for the procedure, the risks of the procedure [including but not limited to: unexpected bleeding possibly requiring hospitalization and/or unplanned repeat procedures, perforation possibly requiring surgical treatment, missed lesions and complications of sedation/MAC (also explained by anesthesia staff)]. I have evaluated the patient for risks associated with the planned anesthesia and the procedure to be performed and find the patient an acceptable candidate for IV sedation.Multiple opportunities were provided for any questions or concerns, and all questions were answered satisfactorily before any anesthesia was administered. We will proceed with the planned procedure.BR

## 2019-04-22 ENCOUNTER — OFFICE (OUTPATIENT)
Dept: URBAN - METROPOLITAN AREA CLINIC 66 | Facility: CLINIC | Age: 49
End: 2019-04-22
Payer: COMMERCIAL

## 2019-04-22 VITALS
SYSTOLIC BLOOD PRESSURE: 125 MMHG | DIASTOLIC BLOOD PRESSURE: 86 MMHG | HEART RATE: 85 BPM | HEIGHT: 63 IN | WEIGHT: 179 LBS

## 2019-04-22 DIAGNOSIS — E66.9 OBESITY, UNSPECIFIED: ICD-10-CM

## 2019-04-22 DIAGNOSIS — K59.00 CONSTIPATION, UNSPECIFIED: ICD-10-CM

## 2019-04-22 DIAGNOSIS — R13.10 DYSPHAGIA, UNSPECIFIED: ICD-10-CM

## 2019-04-22 DIAGNOSIS — K31.84 GASTROPARESIS: ICD-10-CM

## 2019-04-22 PROCEDURE — 99213 OFFICE O/P EST LOW 20 MIN: CPT

## 2019-06-21 ENCOUNTER — OFFICE (OUTPATIENT)
Dept: URBAN - METROPOLITAN AREA CLINIC 66 | Facility: CLINIC | Age: 49
End: 2019-06-21
Payer: COMMERCIAL

## 2019-06-21 VITALS
HEART RATE: 86 BPM | DIASTOLIC BLOOD PRESSURE: 89 MMHG | WEIGHT: 180 LBS | SYSTOLIC BLOOD PRESSURE: 169 MMHG | HEIGHT: 63 IN

## 2019-06-21 DIAGNOSIS — K58.1 IRRITABLE BOWEL SYNDROME WITH CONSTIPATION: ICD-10-CM

## 2019-06-21 DIAGNOSIS — R13.10 DYSPHAGIA, UNSPECIFIED: ICD-10-CM

## 2019-06-21 DIAGNOSIS — K21.9 GASTRO-ESOPHAGEAL REFLUX DISEASE WITHOUT ESOPHAGITIS: ICD-10-CM

## 2019-06-21 PROCEDURE — 99214 OFFICE O/P EST MOD 30 MIN: CPT

## 2019-06-21 RX ORDER — LINACLOTIDE 145 UG/1
145 CAPSULE, GELATIN COATED ORAL
Qty: 90 | Refills: 3 | Status: COMPLETED
Start: 2019-06-21 | End: 2020-12-14

## 2019-06-21 RX ORDER — RANITIDINE HYDROCHLORIDE 300 MG/1
CAPSULE ORAL
Qty: 180 | Refills: 3 | Status: COMPLETED
End: 2019-10-17

## 2019-07-09 ENCOUNTER — ON CAMPUS - OUTPATIENT (OUTPATIENT)
Dept: URBAN - METROPOLITAN AREA HOSPITAL 134 | Facility: HOSPITAL | Age: 49
End: 2019-07-09
Payer: COMMERCIAL

## 2019-07-09 DIAGNOSIS — K44.9 DIAPHRAGMATIC HERNIA WITHOUT OBSTRUCTION OR GANGRENE: ICD-10-CM

## 2019-07-09 DIAGNOSIS — K21.9 GASTRO-ESOPHAGEAL REFLUX DISEASE WITHOUT ESOPHAGITIS: ICD-10-CM

## 2019-07-09 DIAGNOSIS — R10.13 EPIGASTRIC PAIN: ICD-10-CM

## 2019-07-09 DIAGNOSIS — R13.10 DYSPHAGIA, UNSPECIFIED: ICD-10-CM

## 2019-07-09 PROCEDURE — 43239 EGD BIOPSY SINGLE/MULTIPLE: CPT

## 2019-07-09 PROCEDURE — 43450 DILATE ESOPHAGUS 1/MULT PASS: CPT

## 2019-07-25 ENCOUNTER — OFFICE (OUTPATIENT)
Dept: URBAN - METROPOLITAN AREA CLINIC 66 | Facility: CLINIC | Age: 49
End: 2019-07-25
Payer: COMMERCIAL

## 2019-07-25 VITALS
DIASTOLIC BLOOD PRESSURE: 79 MMHG | WEIGHT: 181 LBS | HEIGHT: 63 IN | HEART RATE: 86 BPM | SYSTOLIC BLOOD PRESSURE: 115 MMHG

## 2019-07-25 DIAGNOSIS — K44.9 DIAPHRAGMATIC HERNIA WITHOUT OBSTRUCTION OR GANGRENE: ICD-10-CM

## 2019-07-25 DIAGNOSIS — E66.9 OBESITY, UNSPECIFIED: ICD-10-CM

## 2019-07-25 DIAGNOSIS — R13.10 DYSPHAGIA, UNSPECIFIED: ICD-10-CM

## 2019-07-25 PROCEDURE — 99213 OFFICE O/P EST LOW 20 MIN: CPT

## 2019-07-26 PROBLEM — K44.9 DIAPHRAGMATIC HERNIA WITHOUT OBSTRUCTION OR GANGRENE: Status: ACTIVE | Noted: 2018-05-02

## 2019-10-17 ENCOUNTER — OFFICE (OUTPATIENT)
Dept: URBAN - METROPOLITAN AREA CLINIC 66 | Facility: CLINIC | Age: 49
End: 2019-10-17
Payer: COMMERCIAL

## 2019-10-17 VITALS
HEIGHT: 63 IN | SYSTOLIC BLOOD PRESSURE: 129 MMHG | HEART RATE: 75 BPM | WEIGHT: 182 LBS | DIASTOLIC BLOOD PRESSURE: 83 MMHG

## 2019-10-17 DIAGNOSIS — R10.9 UNSPECIFIED ABDOMINAL PAIN: ICD-10-CM

## 2019-10-17 DIAGNOSIS — R85.0 ABNORMAL LEVEL OF ENZYMES IN SPECIMENS FROM DIGESTIVE ORGANS: ICD-10-CM

## 2019-10-17 PROCEDURE — 99214 OFFICE O/P EST MOD 30 MIN: CPT

## 2019-10-28 ENCOUNTER — OFFICE (OUTPATIENT)
Dept: URBAN - METROPOLITAN AREA CLINIC 66 | Facility: CLINIC | Age: 49
End: 2019-10-28
Payer: COMMERCIAL

## 2019-10-28 VITALS — HEIGHT: 63 IN

## 2019-10-28 DIAGNOSIS — K31.84 GASTROPARESIS: ICD-10-CM

## 2019-10-28 DIAGNOSIS — R10.9 UNSPECIFIED ABDOMINAL PAIN: ICD-10-CM

## 2019-10-28 DIAGNOSIS — K59.00 CONSTIPATION, UNSPECIFIED: ICD-10-CM

## 2019-10-28 PROCEDURE — 99214 OFFICE O/P EST MOD 30 MIN: CPT

## 2019-12-11 ENCOUNTER — OFFICE (OUTPATIENT)
Dept: URBAN - METROPOLITAN AREA CLINIC 66 | Facility: CLINIC | Age: 49
End: 2019-12-11
Payer: COMMERCIAL

## 2019-12-11 VITALS
HEART RATE: 77 BPM | HEIGHT: 63 IN | WEIGHT: 178 LBS | DIASTOLIC BLOOD PRESSURE: 89 MMHG | SYSTOLIC BLOOD PRESSURE: 130 MMHG

## 2019-12-11 DIAGNOSIS — R19.5 OTHER FECAL ABNORMALITIES: ICD-10-CM

## 2019-12-11 DIAGNOSIS — E66.9 OBESITY, UNSPECIFIED: ICD-10-CM

## 2019-12-11 DIAGNOSIS — K31.84 GASTROPARESIS: ICD-10-CM

## 2019-12-11 DIAGNOSIS — K92.1 MELENA: ICD-10-CM

## 2019-12-11 DIAGNOSIS — R15.0 INCOMPLETE DEFECATION: ICD-10-CM

## 2019-12-11 DIAGNOSIS — K59.00 CONSTIPATION, UNSPECIFIED: ICD-10-CM

## 2019-12-11 PROCEDURE — 99214 OFFICE O/P EST MOD 30 MIN: CPT

## 2019-12-18 ENCOUNTER — OFFICE (OUTPATIENT)
Dept: URBAN - METROPOLITAN AREA PATHOLOGY 4 | Facility: PATHOLOGY | Age: 49
End: 2019-12-18
Payer: COMMERCIAL

## 2019-12-18 ENCOUNTER — AMBULATORY SURGICAL CENTER (OUTPATIENT)
Dept: URBAN - METROPOLITAN AREA SURGERY 20 | Facility: SURGERY | Age: 49
End: 2019-12-18
Payer: COMMERCIAL

## 2019-12-18 VITALS — HEIGHT: 63 IN

## 2019-12-18 DIAGNOSIS — D17.79 BENIGN LIPOMATOUS NEOPLASM OF OTHER SITES: ICD-10-CM

## 2019-12-18 DIAGNOSIS — K44.9 DIAPHRAGMATIC HERNIA WITHOUT OBSTRUCTION OR GANGRENE: ICD-10-CM

## 2019-12-18 DIAGNOSIS — D12.3 BENIGN NEOPLASM OF TRANSVERSE COLON: ICD-10-CM

## 2019-12-18 DIAGNOSIS — R12 HEARTBURN: ICD-10-CM

## 2019-12-18 DIAGNOSIS — K58.1 IRRITABLE BOWEL SYNDROME WITH CONSTIPATION: ICD-10-CM

## 2019-12-18 DIAGNOSIS — K57.30 DIVERTICULOSIS OF LARGE INTESTINE WITHOUT PERFORATION OR ABS: ICD-10-CM

## 2019-12-18 DIAGNOSIS — R19.4 CHANGE IN BOWEL HABIT: ICD-10-CM

## 2019-12-18 DIAGNOSIS — R13.10 DYSPHAGIA, UNSPECIFIED: ICD-10-CM

## 2019-12-18 LAB
GI HISTOLOGY: A. SELECT: (no result)
GI HISTOLOGY: PDF REPORT: (no result)

## 2019-12-18 PROCEDURE — 43235 EGD DIAGNOSTIC BRUSH WASH: CPT

## 2019-12-18 PROCEDURE — 43450 DILATE ESOPHAGUS 1/MULT PASS: CPT

## 2019-12-18 PROCEDURE — 45385 COLONOSCOPY W/LESION REMOVAL: CPT

## 2019-12-18 PROCEDURE — 88305 TISSUE EXAM BY PATHOLOGIST: CPT

## 2020-03-25 ENCOUNTER — TELEHEALTH PROVIDED OTHER THAN IN PATIENT'S HOME (OUTPATIENT)
Dept: URBAN - METROPOLITAN AREA TELEHEALTH 5 | Facility: TELEHEALTH | Age: 50
End: 2020-03-25
Payer: COMMERCIAL

## 2020-03-25 VITALS — HEIGHT: 63 IN

## 2020-03-25 DIAGNOSIS — K44.9 DIAPHRAGMATIC HERNIA WITHOUT OBSTRUCTION OR GANGRENE: ICD-10-CM

## 2020-03-25 DIAGNOSIS — R13.10 DYSPHAGIA, UNSPECIFIED: ICD-10-CM

## 2020-03-25 DIAGNOSIS — K58.1 IRRITABLE BOWEL SYNDROME WITH CONSTIPATION: ICD-10-CM

## 2020-03-25 DIAGNOSIS — K57.30 DIVERTICULOSIS OF LARGE INTESTINE WITHOUT PERFORATION OR ABS: ICD-10-CM

## 2020-03-25 PROCEDURE — G2012 BRIEF CHECK IN BY MD/QHP: HCPCS | Performed by: INTERNAL MEDICINE

## 2020-03-25 RX ORDER — PANTOPRAZOLE SODIUM 40 MG/1
TABLET, DELAYED RELEASE ORAL
Qty: 180 | Refills: 3 | Status: ACTIVE

## 2020-03-25 RX ORDER — METOCLOPRAMIDE 5 MG/1
TABLET ORAL
Qty: 120 | Refills: 3 | Status: COMPLETED
Start: 2018-10-31 | End: 2020-03-25

## 2020-03-25 RX ORDER — LINACLOTIDE 145 UG/1
145 CAPSULE, GELATIN COATED ORAL
Qty: 90 | Refills: 3 | Status: COMPLETED
Start: 2019-06-21 | End: 2020-12-14

## 2020-05-28 ENCOUNTER — AMBULATORY SURGICAL CENTER (OUTPATIENT)
Dept: URBAN - METROPOLITAN AREA SURGERY 20 | Facility: SURGERY | Age: 50
End: 2020-05-28

## 2020-05-28 VITALS — HEIGHT: 63 IN

## 2020-05-28 DIAGNOSIS — K44.9 DIAPHRAGMATIC HERNIA WITHOUT OBSTRUCTION OR GANGRENE: ICD-10-CM

## 2020-05-28 DIAGNOSIS — R13.10 DYSPHAGIA, UNSPECIFIED: ICD-10-CM

## 2020-05-28 DIAGNOSIS — K22.2 ESOPHAGEAL OBSTRUCTION: ICD-10-CM

## 2020-05-28 PROCEDURE — 43450 DILATE ESOPHAGUS 1/MULT PASS: CPT | Performed by: INTERNAL MEDICINE

## 2020-05-28 PROCEDURE — 43235 EGD DIAGNOSTIC BRUSH WASH: CPT | Performed by: INTERNAL MEDICINE

## 2020-09-15 ENCOUNTER — OFFICE (OUTPATIENT)
Dept: URBAN - METROPOLITAN AREA CLINIC 66 | Facility: CLINIC | Age: 50
End: 2020-09-15
Payer: COMMERCIAL

## 2020-09-15 VITALS
SYSTOLIC BLOOD PRESSURE: 118 MMHG | WEIGHT: 169 LBS | HEIGHT: 63 IN | HEART RATE: 78 BPM | DIASTOLIC BLOOD PRESSURE: 77 MMHG

## 2020-09-15 DIAGNOSIS — R10.9 UNSPECIFIED ABDOMINAL PAIN: ICD-10-CM

## 2020-09-15 DIAGNOSIS — R14.0 ABDOMINAL DISTENSION (GASEOUS): ICD-10-CM

## 2020-09-15 PROCEDURE — 99213 OFFICE O/P EST LOW 20 MIN: CPT | Performed by: NURSE PRACTITIONER

## 2020-09-15 RX ORDER — SIMETHICONE 180 MG
720 CAPSULE ORAL
Qty: 120 | Refills: 5 | Status: ACTIVE
Start: 2020-09-15

## 2020-09-15 RX ORDER — METRONIDAZOLE 250 MG/1
750 TABLET, FILM COATED ORAL
Qty: 30 | Refills: 0 | Status: ACTIVE
Start: 2020-09-15

## 2020-11-09 ENCOUNTER — OFFICE (OUTPATIENT)
Dept: URBAN - METROPOLITAN AREA CLINIC 66 | Facility: CLINIC | Age: 50
End: 2020-11-09
Payer: COMMERCIAL

## 2020-11-09 VITALS
SYSTOLIC BLOOD PRESSURE: 107 MMHG | HEART RATE: 75 BPM | WEIGHT: 168 LBS | DIASTOLIC BLOOD PRESSURE: 66 MMHG | TEMPERATURE: 98.2 F | HEIGHT: 63 IN

## 2020-11-09 DIAGNOSIS — R10.84 GENERALIZED ABDOMINAL PAIN: ICD-10-CM

## 2020-11-09 DIAGNOSIS — R14.0 ABDOMINAL DISTENSION (GASEOUS): ICD-10-CM

## 2020-11-09 PROCEDURE — 99213 OFFICE O/P EST LOW 20 MIN: CPT | Performed by: NURSE PRACTITIONER

## 2020-11-09 RX ORDER — FAMOTIDINE 40 MG/1
40 TABLET, FILM COATED ORAL
Qty: 30 | Refills: 11 | Status: ACTIVE
Start: 2020-11-09

## 2020-11-09 RX ORDER — AMITRIPTYLINE HYDROCHLORIDE 10 MG/1
10 TABLET, FILM COATED ORAL
Qty: 30 | Refills: 5 | Status: COMPLETED
Start: 2020-11-09 | End: 2020-12-14

## 2020-11-09 RX ORDER — LINACLOTIDE 145 UG/1
145 CAPSULE, GELATIN COATED ORAL
Qty: 90 | Refills: 3 | Status: COMPLETED
Start: 2020-11-09 | End: 2020-12-14

## 2020-12-14 ENCOUNTER — OFFICE (OUTPATIENT)
Dept: URBAN - METROPOLITAN AREA CLINIC 66 | Facility: CLINIC | Age: 50
End: 2020-12-14
Payer: COMMERCIAL

## 2020-12-14 VITALS
TEMPERATURE: 97.6 F | HEART RATE: 69 BPM | WEIGHT: 171 LBS | HEIGHT: 63 IN | SYSTOLIC BLOOD PRESSURE: 120 MMHG | DIASTOLIC BLOOD PRESSURE: 75 MMHG

## 2020-12-14 DIAGNOSIS — K59.00 CONSTIPATION, UNSPECIFIED: ICD-10-CM

## 2020-12-14 DIAGNOSIS — R10.32 LEFT LOWER QUADRANT PAIN: ICD-10-CM

## 2020-12-14 DIAGNOSIS — R14.0 ABDOMINAL DISTENSION (GASEOUS): ICD-10-CM

## 2020-12-14 PROCEDURE — 99213 OFFICE O/P EST LOW 20 MIN: CPT | Performed by: NURSE PRACTITIONER

## 2020-12-14 RX ORDER — LUBIPROSTONE 24 UG/1
24 CAPSULE, GELATIN COATED ORAL
Qty: 30 | Refills: 12 | Status: COMPLETED
Start: 2020-12-14 | End: 2021-01-05

## 2020-12-14 RX ORDER — LINACLOTIDE 145 UG/1
145 CAPSULE, GELATIN COATED ORAL
Qty: 90 | Refills: 3 | Status: COMPLETED
Start: 2020-11-09 | End: 2020-12-14

## 2020-12-14 RX ORDER — AMITRIPTYLINE HYDROCHLORIDE 10 MG/1
10 TABLET, FILM COATED ORAL
Qty: 30 | Refills: 5 | Status: COMPLETED
Start: 2020-11-09 | End: 2020-12-14

## 2020-12-14 RX ORDER — LINACLOTIDE 145 UG/1
145 CAPSULE, GELATIN COATED ORAL
Qty: 90 | Refills: 3 | Status: COMPLETED
Start: 2019-06-21 | End: 2020-12-14

## 2020-12-14 RX ORDER — NORTRIPTYLINE HYDROCHLORIDE 25 MG/1
25 CAPSULE ORAL
Qty: 30 | Refills: 3 | Status: ACTIVE
Start: 2020-12-14